# Patient Record
Sex: FEMALE | Race: WHITE | NOT HISPANIC OR LATINO | Employment: FULL TIME | URBAN - METROPOLITAN AREA
[De-identification: names, ages, dates, MRNs, and addresses within clinical notes are randomized per-mention and may not be internally consistent; named-entity substitution may affect disease eponyms.]

---

## 2023-04-10 ENCOUNTER — TELEMEDICINE (OUTPATIENT)
Dept: PRIMARY CARE | Facility: CLINIC | Age: 33
End: 2023-04-10
Payer: COMMERCIAL

## 2023-04-10 DIAGNOSIS — J06.9 VIRAL UPPER RESPIRATORY TRACT INFECTION: Primary | ICD-10-CM

## 2023-04-10 DIAGNOSIS — K50.90 CROHN'S DISEASE WITHOUT COMPLICATION, UNSPECIFIED GASTROINTESTINAL TRACT LOCATION (MULTI): ICD-10-CM

## 2023-04-10 PROCEDURE — 99213 OFFICE O/P EST LOW 20 MIN: CPT | Performed by: FAMILY MEDICINE

## 2023-04-26 NOTE — PROGRESS NOTES
Subjective   Ana Maria Layton is a 32 y.o. female who presents for evaluation of symptoms of a URI. Symptoms include congestion, low grade fever, and sinus pressure. Onset of symptoms was 6 days ago and has been gradually worsening since that time. Treatment to date: decongestants.    Objective   Physical Exam : video visit   32 year old female  No cute distress  Maxiallry sinus tenderness    Assessment/Plan   viral upper respiratory illness.    Discussed diagnosis and treatment of URI.  Discussed the importance of avoiding unnecessary antibiotic therapy.  Suggested symptomatic OTC remedies.  Nasal saline spray for congestion.  Follow up as needed.  Call in 7 days if symptoms aren't resolving.   no

## 2023-05-08 DIAGNOSIS — F41.9 ANXIETY: Primary | ICD-10-CM

## 2023-05-08 RX ORDER — ALPRAZOLAM 1 MG/1
1 TABLET ORAL
COMMUNITY
Start: 2014-05-05 | End: 2023-05-08 | Stop reason: SDUPTHER

## 2023-05-08 RX ORDER — ALPRAZOLAM 1 MG/1
1 TABLET ORAL NIGHTLY PRN
Qty: 30 TABLET | Refills: 0 | Status: SHIPPED | OUTPATIENT
Start: 2023-05-08 | End: 2023-05-10 | Stop reason: SDUPTHER

## 2023-05-10 DIAGNOSIS — F41.9 ANXIETY: ICD-10-CM

## 2023-05-11 RX ORDER — ALPRAZOLAM 1 MG/1
1 TABLET ORAL NIGHTLY PRN
Qty: 45 TABLET | Refills: 0 | Status: SHIPPED | OUTPATIENT
Start: 2023-05-11 | End: 2023-05-15

## 2023-05-15 ENCOUNTER — OFFICE VISIT (OUTPATIENT)
Dept: PRIMARY CARE | Facility: CLINIC | Age: 33
End: 2023-05-15
Payer: COMMERCIAL

## 2023-05-15 VITALS
SYSTOLIC BLOOD PRESSURE: 133 MMHG | OXYGEN SATURATION: 99 % | TEMPERATURE: 97.3 F | BODY MASS INDEX: 50.33 KG/M2 | HEART RATE: 82 BPM | DIASTOLIC BLOOD PRESSURE: 94 MMHG | WEIGHT: 293 LBS | RESPIRATION RATE: 18 BRPM

## 2023-05-15 DIAGNOSIS — F41.9 ANXIETY: Primary | ICD-10-CM

## 2023-05-15 DIAGNOSIS — R03.0 ELEVATED BLOOD PRESSURE READING: ICD-10-CM

## 2023-05-15 DIAGNOSIS — E66.01 MORBID OBESITY WITH BODY MASS INDEX (BMI) GREATER THAN OR EQUAL TO 50 (MULTI): ICD-10-CM

## 2023-05-15 DIAGNOSIS — G89.4 CHRONIC PAIN SYNDROME: ICD-10-CM

## 2023-05-15 DIAGNOSIS — F17.200 SMOKER: ICD-10-CM

## 2023-05-15 DIAGNOSIS — E55.9 VITAMIN D DEFICIENCY: ICD-10-CM

## 2023-05-15 PROBLEM — J01.40 ACUTE NON-RECURRENT PANSINUSITIS: Status: ACTIVE | Noted: 2023-05-15

## 2023-05-15 PROBLEM — R31.21 ASYMPTOMATIC MICROSCOPIC HEMATURIA: Status: ACTIVE | Noted: 2023-05-15

## 2023-05-15 PROBLEM — G89.29 CHRONIC PAIN: Status: ACTIVE | Noted: 2019-11-14

## 2023-05-15 PROBLEM — H66.90 OTITIS MEDIA: Status: ACTIVE | Noted: 2019-11-18

## 2023-05-15 PROBLEM — B00.9 HERPES SIMPLEX TYPE 1 INFECTION: Status: ACTIVE | Noted: 2023-05-15

## 2023-05-15 PROBLEM — G57.90 MONONEUROPATHY OF LOWER EXTREMITY: Status: ACTIVE | Noted: 2023-05-15

## 2023-05-15 PROBLEM — R20.0 LEG NUMBNESS: Status: ACTIVE | Noted: 2023-05-15

## 2023-05-15 PROBLEM — K05.10 GINGIVITIS: Status: ACTIVE | Noted: 2023-05-15

## 2023-05-15 PROBLEM — J02.9 ACUTE PHARYNGITIS: Status: ACTIVE | Noted: 2019-11-18

## 2023-05-15 PROBLEM — F41.0 PANIC DISORDER WITHOUT AGORAPHOBIA: Status: ACTIVE | Noted: 2019-11-14

## 2023-05-15 PROBLEM — K50.90 CROHN'S DISEASE (MULTI): Status: ACTIVE | Noted: 2019-11-14

## 2023-05-15 PROBLEM — R10.11 RIGHT UPPER QUADRANT PAIN: Status: ACTIVE | Noted: 2019-11-18

## 2023-05-15 PROBLEM — R20.8 DECREASED SENSATION OF LEG: Status: ACTIVE | Noted: 2023-05-15

## 2023-05-15 PROBLEM — J06.9 ACUTE UPPER RESPIRATORY INFECTION: Status: ACTIVE | Noted: 2019-11-18

## 2023-05-15 PROBLEM — N39.0 ACUTE LOWER UTI (URINARY TRACT INFECTION): Status: ACTIVE | Noted: 2023-05-15

## 2023-05-15 PROBLEM — J02.9 SORE THROAT: Status: ACTIVE | Noted: 2023-05-15

## 2023-05-15 PROBLEM — R53.81 MALAISE AND FATIGUE: Status: ACTIVE | Noted: 2019-11-14

## 2023-05-15 PROBLEM — R31.9 HEMATURIA OF UNKNOWN ETIOLOGY: Status: ACTIVE | Noted: 2023-05-15

## 2023-05-15 PROBLEM — R05.8 PRODUCTIVE COUGH: Status: ACTIVE | Noted: 2023-05-15

## 2023-05-15 PROBLEM — G47.00 INSOMNIA: Status: ACTIVE | Noted: 2019-11-18

## 2023-05-15 PROBLEM — R39.15 URGENCY OF URINATION: Status: ACTIVE | Noted: 2023-05-15

## 2023-05-15 PROBLEM — R53.83 MALAISE AND FATIGUE: Status: ACTIVE | Noted: 2019-11-14

## 2023-05-15 PROBLEM — J01.90 ACUTE SINUSITIS: Status: ACTIVE | Noted: 2019-11-18

## 2023-05-15 PROBLEM — J06.9 URI WITH COUGH AND CONGESTION: Status: ACTIVE | Noted: 2023-05-15

## 2023-05-15 PROBLEM — R35.89 POLYURIA: Status: ACTIVE | Noted: 2019-11-18

## 2023-05-15 PROBLEM — F32.0 MILD MAJOR DEPRESSION (CMS-HCC): Status: ACTIVE | Noted: 2023-05-15

## 2023-05-15 PROBLEM — K64.9 HEMORRHOIDS: Status: ACTIVE | Noted: 2019-11-14

## 2023-05-15 PROBLEM — F41.1 ANXIETY STATE: Status: ACTIVE | Noted: 2019-11-14

## 2023-05-15 PROBLEM — M54.50 ACUTE LOW BACK PAIN: Status: ACTIVE | Noted: 2023-05-15

## 2023-05-15 PROBLEM — R43.2 DYSGEUSIA: Status: ACTIVE | Noted: 2023-05-15

## 2023-05-15 PROBLEM — I26.99 PULMONARY EMBOLISM (MULTI): Status: ACTIVE | Noted: 2023-05-15

## 2023-05-15 PROBLEM — R05.9 COUGH: Status: ACTIVE | Noted: 2023-05-15

## 2023-05-15 PROBLEM — K50.00 TERMINAL ILEITIS (MULTI): Status: ACTIVE | Noted: 2023-05-15

## 2023-05-15 PROBLEM — R32 URINARY INCONTINENCE: Status: ACTIVE | Noted: 2023-05-15

## 2023-05-15 PROBLEM — F32.A MODERATE DEPRESSIVE DISORDER: Status: ACTIVE | Noted: 2019-11-14

## 2023-05-15 PROBLEM — K21.9 GASTROESOPHAGEAL REFLUX DISEASE: Status: ACTIVE | Noted: 2019-11-18

## 2023-05-15 PROBLEM — K12.0 APHTHOUS ULCER OF MOUTH: Status: ACTIVE | Noted: 2019-11-14

## 2023-05-15 PROBLEM — S93.409A SPRAIN OF ANKLE: Status: ACTIVE | Noted: 2019-11-14

## 2023-05-15 PROBLEM — Z20.822 EXPOSURE TO COVID-19 VIRUS: Status: ACTIVE | Noted: 2023-05-15

## 2023-05-15 PROBLEM — R06.00 DYSPNEA: Status: ACTIVE | Noted: 2019-11-18

## 2023-05-15 PROBLEM — F41.0 PANIC ATTACK: Status: ACTIVE | Noted: 2019-11-14

## 2023-05-15 PROCEDURE — 99214 OFFICE O/P EST MOD 30 MIN: CPT | Performed by: INTERNAL MEDICINE

## 2023-05-15 PROCEDURE — 99406 BEHAV CHNG SMOKING 3-10 MIN: CPT | Performed by: INTERNAL MEDICINE

## 2023-05-15 PROCEDURE — 4004F PT TOBACCO SCREEN RCVD TLK: CPT | Performed by: INTERNAL MEDICINE

## 2023-05-15 RX ORDER — AZITHROMYCIN 250 MG/1
TABLET, FILM COATED ORAL
COMMUNITY
Start: 2023-04-13 | End: 2023-05-15

## 2023-05-15 RX ORDER — BENZONATATE 200 MG/1
1 CAPSULE ORAL 3 TIMES DAILY PRN
COMMUNITY
Start: 2023-04-13 | End: 2023-05-15

## 2023-05-15 RX ORDER — PSEUDOEPHEDRINE HYDROCHLORIDE 60 MG/1
TABLET ORAL
COMMUNITY
Start: 2019-11-14 | End: 2023-05-15

## 2023-05-15 RX ORDER — FLUTICASONE PROPIONATE 50 MCG
SPRAY, SUSPENSION (ML) NASAL
COMMUNITY
Start: 2019-11-14 | End: 2023-05-15

## 2023-05-15 RX ORDER — ESCITALOPRAM OXALATE 20 MG/1
1 TABLET ORAL DAILY
COMMUNITY
Start: 2019-09-24 | End: 2024-04-03 | Stop reason: WASHOUT

## 2023-05-15 RX ORDER — VIT C/E/ZN/COPPR/LUTEIN/ZEAXAN 250MG-90MG
25 CAPSULE ORAL DAILY
Qty: 90 CAPSULE | Refills: 3 | Status: SHIPPED | OUTPATIENT
Start: 2023-05-15

## 2023-05-15 RX ORDER — VIT C/E/ZN/COPPR/LUTEIN/ZEAXAN 250MG-90MG
1 CAPSULE ORAL DAILY
COMMUNITY
Start: 2022-04-28 | End: 2023-05-15 | Stop reason: SDUPTHER

## 2023-05-15 ASSESSMENT — PATIENT HEALTH QUESTIONNAIRE - PHQ9
2. FEELING DOWN, DEPRESSED OR HOPELESS: NOT AT ALL
SUM OF ALL RESPONSES TO PHQ9 QUESTIONS 1 AND 2: 0
1. LITTLE INTEREST OR PLEASURE IN DOING THINGS: NOT AT ALL

## 2023-05-15 NOTE — PATIENT INSTRUCTIONS
Plan:   Discussed about obesity and complications related to it. Discussed about weight reduction and regular exercise to decrease weight. Questions related to it answered to patient's satisfaction.   Discussed in detail about smoking issue. Patient understands health risk including effect on heart, lung, stroke with smoking. Discussed about smoking cessation. Will let me know if interested in quitting smoking.   Low salt diet discussed  Continue Lexapro  F/U in August for Physical

## 2023-05-15 NOTE — PROGRESS NOTES
Pt is here for f/U on anxiety , depressed state, morbid obesity , crohn's dis    No recent flare up on crohn's   She weaned herself off of xanax for over a week     Depression , stress symptoms ok     Taking lexapro, which is helping her     Single mom, has 2 daughter , has full custody now so relieved with that stress now    OBJECTIVE :  Morbidly obese  BP (!) 133/94   Pulse 82   Temp 36.3 °C (97.3 °F)   Resp 18   Wt (!) 150 kg (331 lb)   SpO2 99%   BMI 50.33 kg/m²   Bp is elevated  CVS: S1 S2 + , no S3. No loud heart murmur appreciated. Lungs clear, No edema  Doesnot look anxious       Assessment:    1. Anxiety -now off of xanax   2. Vitamin D deficiency -refill done     3. Morbid obesity with body mass index (BMI) greater than or equal to 50 (C  MS/HCC) wt reduction   4. Chronic pain syndrome -on antidepressant   5. Elevated blood pressure reading -will montiror on diet   6. Smoker -discussed quitting         Plan:   Discussed about obesity and complications related to it. Discussed about weight reduction and regular exercise to decrease weight. Questions related to it answered to patient's satisfaction.   Discussed in detail about smoking issue. Patient understands health risk including effect on heart, lung, stroke with smoking. Discussed about smoking cessation. Will let me know if interested in quitting smoking.   Low salt diet discussed  Continue Lexapro  F/U in August for Physical

## 2023-08-21 ENCOUNTER — APPOINTMENT (OUTPATIENT)
Dept: PRIMARY CARE | Facility: CLINIC | Age: 33
End: 2023-08-21
Payer: COMMERCIAL

## 2023-08-28 ENCOUNTER — APPOINTMENT (OUTPATIENT)
Dept: PRIMARY CARE | Facility: CLINIC | Age: 33
End: 2023-08-28
Payer: COMMERCIAL

## 2023-09-29 ENCOUNTER — HOSPITAL ENCOUNTER (OUTPATIENT)
Dept: DATA CONVERSION | Facility: HOSPITAL | Age: 33
End: 2023-09-29
Attending: SURGERY | Admitting: SURGERY
Payer: COMMERCIAL

## 2023-09-29 DIAGNOSIS — K80.10 CALCULUS OF GALLBLADDER WITH CHRONIC CHOLECYSTITIS WITHOUT OBSTRUCTION: ICD-10-CM

## 2023-09-30 NOTE — H&P
"    History & Physical Reviewed:   Pregnant/Lactating:  ·  Are You Pregnant no (1)   ·  Are You Currently Breastfeeding no (1)     I have reviewed the History and Physical dated:  21-Sep-2023   History and Physical reviewed and relevant findings noted. Patient examined to review pertinent physical  findings.: No significant changes   Home Medications Reviewed: no changes noted   Allergies Reviewed: no changes noted       ERAS (Enhanced Recovery After Surgery):  ·  ERAS Patient: no     Consent:   COVID-19 Consent:  ·  COVID-19 Risk Consent Surgeon has reviewed key risks related to the risk of heath COVID-19 and if they contract COVID-19 what the risks are.       Electronic Signatures:  Kleber Ferris)  (Signed 29-Sep-2023 09:43)   Authored: History & Physical Reviewed, ERAS, Consent,  Note Completion      Last Updated: 29-Sep-2023 09:43 by Kleber Ferris)    References:  1.  Data Referenced From \"Patient Profile - Preop v3\" 28-Sep-2023 11:26   "

## 2023-10-01 NOTE — OP NOTE
PROCEDURE DETAILS    Preoperative Diagnosis:  Calculus of gallbladder with cholecystitis without biliary obstruction, K80.10    Postoperative Diagnosis:  Calculus of gallbladder with cholecystitis without biliary obstruction, K80.10    Surgeon: Kleber Ferris  Resident/Fellow/Other Assistant: Wilmar Watters    Procedure:  1. LAPAROSCOPIC CHOLECYSTECTOMY WITH OPERATIVE CHOLANGIORAMS & C-ARM    Anesthesia: No anesthesiologist associated with this case  Estimated Blood Loss: 0  Findings: Same with cholangiogram demonstrated in diameter ducts without intraluminal filling defect  Specimens(s) Collected: yes,  Gallbladder and stones   Drains and/or Catheters: None  Patient Returned To/Condition: Improved                                Attestation:   Note Completion:  Attending Attestation I performed the procedure without a resident         Electronic Signatures:  Kleber Ferris)  (Signed 29-Sep-2023 09:48)   Authored: Post-Operative Note, Chart Review, Note Completion      Last Updated: 29-Sep-2023 09:48 by Kleber Ferris)

## 2023-10-04 LAB
COMPLETE PATHOLOGY REPORT: NORMAL
CONVERTED CLINICAL DIAGNOSIS-HISTORY: NORMAL
CONVERTED FINAL DIAGNOSIS: NORMAL
CONVERTED FINAL REPORT PDF LINK TO COPY AND PASTE: NORMAL
CONVERTED GROSS DESCRIPTION: NORMAL

## 2023-10-11 PROBLEM — J02.9 PHARYNGITIS: Status: ACTIVE | Noted: 2023-10-11

## 2023-10-11 PROBLEM — K80.10 CALCULUS OF GALLBLADDER WITH CHRONIC CHOLECYSTITIS WITHOUT OBSTRUCTION: Status: ACTIVE | Noted: 2023-10-11

## 2023-10-11 RX ORDER — OXYCODONE AND ACETAMINOPHEN 5; 325 MG/1; MG/1
1 TABLET ORAL EVERY 8 HOURS PRN
COMMUNITY
Start: 2023-09-20

## 2023-10-11 RX ORDER — PSEUDOEPHEDRINE HYDROCHLORIDE 60 MG/1
1 TABLET ORAL
COMMUNITY
Start: 2019-11-14

## 2023-10-11 RX ORDER — ONDANSETRON 4 MG/1
1 TABLET, ORALLY DISINTEGRATING ORAL EVERY 8 HOURS PRN
COMMUNITY
Start: 2023-09-20

## 2023-10-12 ENCOUNTER — OFFICE VISIT (OUTPATIENT)
Dept: SURGERY | Facility: CLINIC | Age: 33
End: 2023-10-12
Payer: COMMERCIAL

## 2023-10-12 VITALS
HEART RATE: 92 BPM | OXYGEN SATURATION: 97 % | BODY MASS INDEX: 44.41 KG/M2 | RESPIRATION RATE: 16 BRPM | HEIGHT: 68 IN | SYSTOLIC BLOOD PRESSURE: 125 MMHG | DIASTOLIC BLOOD PRESSURE: 57 MMHG | TEMPERATURE: 98 F | WEIGHT: 293 LBS

## 2023-10-12 DIAGNOSIS — K81.9 CHOLECYSTITIS: Primary | ICD-10-CM

## 2023-10-12 PROCEDURE — 4004F PT TOBACCO SCREEN RCVD TLK: CPT | Performed by: SURGERY

## 2023-10-12 PROCEDURE — 99024 POSTOP FOLLOW-UP VISIT: CPT | Performed by: SURGERY

## 2023-10-12 NOTE — LETTER
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    If you have any questions or concerns, please don't hesitate to call.         Sincerely,        Kleber Ferris MD

## 2023-10-12 NOTE — PROGRESS NOTES
History Of Present Illness  HPI 33-year-old female with history of Crohn's disease, BMI 51.7 underwent laparoscopic cholecystectomy with unremarkable cholangiograms for acute cholecystitis 3 weeks ago.  She was discharged home on the first postoperative day.  Since discharge she complains of pain only around the umbilicus.  She has had mild exacerbation of her Crohn's symptoms.  However there has been no fever no chills no dark urine or acholic stools  Past Medical History  She has a past medical history of Personal history of other diseases of the digestive system (03/29/2018), Personal history of other endocrine, nutritional and metabolic disease (01/17/2019), Personal history of other mental and behavioral disorders (03/29/2018), and Personal history of pulmonary embolism (08/24/2018).    Surgical History  She has a past surgical history that includes Tonsillectomy (01/18/2018); Colonoscopy (01/18/2018); Other surgical history (01/18/2018); and Other surgical history (01/18/2018).     Social History  She reports that she has been smoking. She has never used smokeless tobacco. No history on file for alcohol use and drug use.    Family History  Family History   Problem Relation Name Age of Onset    Hypertension Mother      Hypertension Father          Allergies  Amoxicillin, Latex, Loracarbef, and Penicillins    Review of Systems currently denies chest pain shortness of breath leg pain.  See HPI     Visit Vitals  /57 (BP Location: Right arm, Patient Position: Sitting, BP Cuff Size: Large adult)   Pulse 92   Temp 36.7 °C (98 °F) (Temporal)   Resp 16        Physical Exam    BMI 51.7  General  Mental status: Alert. General Appearance: Not in acute distress. Orientation: Oriented X4.  Respiratory effort symmetric without accessory muscle use  Abdomen  Inspection: Inspection of the abdomen reveals - non-distended. Surgical Incision Details: [ Clean, dry and intact ] Palpation/Percussion: Palpation and Percussion  "of the abdomen reveal - Soft and non tender.    Peripheral Vasclar  Lower Extremity:  Palpation: Edema - Left: no edema. Right: no edema.  Last Recorded Vitals  Blood pressure 125/57, pulse 92, temperature 36.7 °C (98 °F), temperature source Temporal, resp. rate 16, height 1.727 m (5' 8\"), weight (!) 154 kg (340 lb 9.6 oz), SpO2 97 %.    Relevant Results      FL less than 1 hour    Result Date: 9/29/2023  Interpreted By:  GAYLA OCHOA MD MRN: [id number] Patient Name: JOSE SWANN  STUDY: FLUOROSCOPY, UP TO 1 HR;  9/29/2023 9:20 am  INDICATION: LAP YURIY .  COMPARISON: None.  ACCESSION NUMBER(S): [id number]  ORDERING CLINICIAN: SANJUANA NGUYEN  TECHNIQUE: C-arm images with contrast injection of the biliary tree via a stent at the cystic duct were submitted for interpretation. Total fluoroscopic time was  41 seconds. Recommend correlation to real time fluoroscopy and to final operative report.  FINDINGS: Common bile and hepatic ducts:  Unremarkable without dilatation or filling defects. Intrahepatic biliary tree:  Grossly unremarkable with partial filling as visualized. Pancreatic duct:  Not visualized. Duodenal:  Contrast excretion indicates biliary patency. Other findings:  None significant.      1.  Unremarkable intraoperative cholangiogram. 2.  Findings as detailed above.  Correlate with real time fluoroscopy and operative report.      US gallbladder    Result Date: 9/20/2023  Interpreted By:  SANTIAGO BADILLO MD MRN: [id number] Patient Name: JOSE SWANN  STUDY: US GALLBLADDER;  9/20/2023 8:54 am  INDICATION: ruq .  COMPARISON: None.  ACCESSION NUMBER(S): [id number]  ORDERING CLINICIAN: ANA LUISA BARAJAS  TECHNIQUE: Multiple images of the right upper quadrant were obtained.  FINDINGS: LIVER: Coarsened heterogeneous liver suggesting fatty infiltration. The liver is borderline enlarged measuring 20 cm. No definite focal liver lesions.  GALLBLADDER: The gallbladder is remarkable for a large calcified gallstone. No gallbladder " wall thickening or ultrasonic Hodges sign.   BILIARY TREE: The common bile duct is 4 mm.  PANCREAS: The pancreas is mostly obscured by bowel gas and not evaluated.  RIGHT KIDNEY: The right kidney is 12.9 cm in longest axis and without hydronephrosis.      Coarsened echogenic liver suggesting fatty infiltration.  Cholelithiasis.  Suboptimal visualization of the pancreas.  MACRO: None        @[email or web address]@    Assessment/Plan       Satisfactory postop course.  I reviewed the documentation, imaging and laboratory studies.  No restrictions to diet or activity       I spent  minutes in the professional and overall care of this patient.      Kleber Ferris MD

## 2023-10-12 NOTE — PATIENT INSTRUCTIONS
Thank you for choosing [facility].  Your surgical incisions following the recent removal of your gallbladder are without signs of infection.  There are no restrictions to diet or activity.  You may continue to experience some pulling pain around her navel.  This is not unexpected.  Please use Tylenol, ice for discomfort.  I recommend continued follow-up with your gastroenterologist regarding your symptoms of Crohn's disease.  Contact the office for any questions regarding today's exam or your recent procedure

## 2023-11-17 LAB
ATRIAL RATE: 94 BPM
P AXIS: 25 DEGREES
P OFFSET: 193 MS
P ONSET: 145 MS
PR INTERVAL: 150 MS
Q ONSET: 220 MS
QRS COUNT: 16 BEATS
QRS DURATION: 80 MS
QT INTERVAL: 358 MS
QTC CALCULATION(BAZETT): 447 MS
QTC FREDERICIA: 415 MS
R AXIS: 29 DEGREES
T AXIS: -3 DEGREES
T OFFSET: 399 MS
VENTRICULAR RATE: 94 BPM

## 2024-04-01 ENCOUNTER — APPOINTMENT (OUTPATIENT)
Dept: PRIMARY CARE | Facility: CLINIC | Age: 34
End: 2024-04-01
Payer: COMMERCIAL

## 2024-04-03 ENCOUNTER — OFFICE VISIT (OUTPATIENT)
Dept: PRIMARY CARE | Facility: CLINIC | Age: 34
End: 2024-04-03
Payer: COMMERCIAL

## 2024-04-03 VITALS
BODY MASS INDEX: 44.41 KG/M2 | WEIGHT: 293 LBS | DIASTOLIC BLOOD PRESSURE: 120 MMHG | TEMPERATURE: 98 F | SYSTOLIC BLOOD PRESSURE: 170 MMHG | OXYGEN SATURATION: 98 % | HEART RATE: 110 BPM | HEIGHT: 68 IN

## 2024-04-03 DIAGNOSIS — F17.200 SMOKER: ICD-10-CM

## 2024-04-03 DIAGNOSIS — K50.90 CROHN'S DISEASE WITHOUT COMPLICATION, UNSPECIFIED GASTROINTESTINAL TRACT LOCATION (MULTI): ICD-10-CM

## 2024-04-03 DIAGNOSIS — N39.41 URGE INCONTINENCE: ICD-10-CM

## 2024-04-03 DIAGNOSIS — I10 BENIGN ESSENTIAL HTN: ICD-10-CM

## 2024-04-03 DIAGNOSIS — E66.01 MORBID OBESITY WITH BODY MASS INDEX (BMI) GREATER THAN OR EQUAL TO 50 (MULTI): ICD-10-CM

## 2024-04-03 DIAGNOSIS — Z00.00 ANNUAL PHYSICAL EXAM: Primary | ICD-10-CM

## 2024-04-03 DIAGNOSIS — Z90.49 S/P LAPAROSCOPIC CHOLECYSTECTOMY: ICD-10-CM

## 2024-04-03 DIAGNOSIS — F41.9 ANXIETY: ICD-10-CM

## 2024-04-03 PROCEDURE — 3080F DIAST BP >= 90 MM HG: CPT | Performed by: INTERNAL MEDICINE

## 2024-04-03 PROCEDURE — 3077F SYST BP >= 140 MM HG: CPT | Performed by: INTERNAL MEDICINE

## 2024-04-03 PROCEDURE — 99395 PREV VISIT EST AGE 18-39: CPT | Performed by: INTERNAL MEDICINE

## 2024-04-03 PROCEDURE — 99406 BEHAV CHNG SMOKING 3-10 MIN: CPT | Performed by: INTERNAL MEDICINE

## 2024-04-03 RX ORDER — BUSPIRONE HYDROCHLORIDE 10 MG/1
10 TABLET ORAL 3 TIMES DAILY
Qty: 90 TABLET | Refills: 11 | Status: SHIPPED | OUTPATIENT
Start: 2024-04-03 | End: 2025-04-03

## 2024-04-03 RX ORDER — OXYBUTYNIN CHLORIDE 5 MG/1
5 TABLET ORAL DAILY
Qty: 30 TABLET | Refills: 11 | Status: SHIPPED | OUTPATIENT
Start: 2024-04-03 | End: 2025-04-03

## 2024-04-03 RX ORDER — LISINOPRIL 20 MG/1
20 TABLET ORAL DAILY
Qty: 100 TABLET | Refills: 3 | Status: SHIPPED | OUTPATIENT
Start: 2024-04-03 | End: 2024-05-07 | Stop reason: SDUPTHER

## 2024-04-03 NOTE — PROGRESS NOTES
"My nurse note reviewed. Patient is here for:  Hypertension (1 time a week chest tightness/ ) and Annual Exam       Pt is here for annual exam and follow up     Had laparoscopic cholecystectomy for GB stones. Last yr. Doing ok     Feel flushed in afternoon lately , thinks it is due to high BP   Gets anxious very easily , works in accounting.   Doesnot feel depressed , doesnot want to be on antidepressant. On lexapro she gained lot of weight.     Patient denies any shortness of breath, PND, orthopnea, chest pain , palpitation, syncope or edema in legs  patient denies any abdominal pain, tenderness, nausea, vomiting, change in bowel habits or blood in stool.  Patient denies any weakness in extremities.. Denies any headache, visual symptoms , speech problems or  tremors . No TIA or stroke like symptoms..    Lives with 2 kids and dog.   Just started vaping lately . She thinks it is due to her anxiety issue.     Hx of crohn's  dis. Stable lately .   EKG was normal last year   REVIEW OF SYSTEMS:   All other systems have been reviewed and are negative in relation to patient's complaint and other than what is mentioned in History of present illness.       OBJECTIVE :  Morbidly obese  BP (!) 170/120   Pulse 110   Temp 36.7 °C (98 °F)   Ht 1.727 m (5' 8\")   Wt (!) 156 kg (343 lb)   SpO2 98%   BMI 52.15 kg/m²   Repeat bp is high too  Vitals noted   Not in acute distress  Conj Pink, No icterus  Neck:No Cervical LN enlargement, No Thyroid enlargement No carotid bruit  Lungs: good air entry bilaterally, no rales or rhonchi  CVS: S1 S2 + , no S3. No loud heart murmur heard.   Abdomen: Soft, non tender , BS +. no organomegaly , no CVA tenderness  MSK: No spine tenderness or muscle tenderness noted on gross examination  CNS: Pt is alert, moving all 4 extremities. no motor weakness or abnormal movements noted on gross examination.  Extremities: No edema, No calf tenderness, Neda's sign negative.    Assessment:  1. Annual " physical exam -done. Tests ordered CBC and Auto Differential    Basic Metabolic Panel    Hepatic Function Panel    Lipid Panel Non-Fasting    Thyroid Stimulating Hormone      2. Anxiety -new med started busPIRone (Buspar) 10 mg tablet      3. Crohn's disease without complication, unspecified gastrointestinal tract location (CMS/HCC)  Hx of. Doing ok      4. Smoker  Discussed in detail about smoking /vaping issue. Patient understands health risk including effect on heart, lung, stroke with smoking. Discussed about smoking cessation. Will let me know if interested in quitting smoking. Total time was > 3 minutes.        5. Morbid obesity with body mass index (BMI) greater than or equal to 50 (CMS/HCC)  Discussed about obesity and complications related to it. Discussed about weight reduction and regular exercise to decrease weight. Questions related to it answered to patient's satisfaction.                7. S/P laparoscopic cholecystectomy        8. Urge incontinence  oxybutynin (Ditropan) 5 mg tablet      9. Benign essential HTN uncontrolled  Started on new med.  lisinopril 20 mg tablet        Plan  Annual physical done.   Started on new med for BP and anxiety   Side effects of medication were discussed. All questions related to medication answered to patient's satisfaction  Refill on oxybutnin done. She used to get it from her urologist . Has not seen him for a while.   Discussed about obesity and complications related to it. Discussed about weight reduction and regular exercise to decrease weight. Questions related to it answered to patient's satisfaction.  Blood tests ordered to be done   Continue other meds as before.   F/U 6 weeks for HTN follow up

## 2024-04-03 NOTE — PATIENT INSTRUCTIONS
Plan  Annual physical done.   Started on new med for BP and anxiety   Refill on oxybutnin done.   Discussed about obesity and complications related to it. Discussed about weight reduction and regular exercise to decrease weight. Questions related to it answered to patient's satisfaction.  Blood tests ordered to be done   Continue other meds as before.   F/U 6 weeks for HTN follow up

## 2024-04-20 ENCOUNTER — LAB (OUTPATIENT)
Dept: LAB | Facility: LAB | Age: 34
End: 2024-04-20
Payer: COMMERCIAL

## 2024-04-20 DIAGNOSIS — Z00.00 ANNUAL PHYSICAL EXAM: ICD-10-CM

## 2024-04-20 LAB
ALBUMIN SERPL BCP-MCNC: 4.2 G/DL (ref 3.4–5)
ALP SERPL-CCNC: 58 U/L (ref 33–110)
ALT SERPL W P-5'-P-CCNC: 27 U/L (ref 7–45)
ANION GAP SERPL CALC-SCNC: 12 MMOL/L (ref 10–20)
AST SERPL W P-5'-P-CCNC: 15 U/L (ref 9–39)
BASOPHILS # BLD AUTO: 0.03 X10*3/UL (ref 0–0.1)
BASOPHILS NFR BLD AUTO: 0.5 %
BILIRUB DIRECT SERPL-MCNC: 0.1 MG/DL (ref 0–0.3)
BILIRUB SERPL-MCNC: 0.4 MG/DL (ref 0–1.2)
BUN SERPL-MCNC: 18 MG/DL (ref 6–23)
CALCIUM SERPL-MCNC: 9 MG/DL (ref 8.6–10.3)
CHLORIDE SERPL-SCNC: 106 MMOL/L (ref 98–107)
CHOLEST SERPL-MCNC: 167 MG/DL (ref 0–199)
CHOLESTEROL/HDL RATIO: 4.2
CO2 SERPL-SCNC: 28 MMOL/L (ref 21–32)
CREAT SERPL-MCNC: 0.9 MG/DL (ref 0.5–1.05)
EGFRCR SERPLBLD CKD-EPI 2021: 86 ML/MIN/1.73M*2
EOSINOPHIL # BLD AUTO: 0.1 X10*3/UL (ref 0–0.7)
EOSINOPHIL NFR BLD AUTO: 1.8 %
ERYTHROCYTE [DISTWIDTH] IN BLOOD BY AUTOMATED COUNT: 14.6 % (ref 11.5–14.5)
GLUCOSE SERPL-MCNC: 103 MG/DL (ref 74–99)
HCT VFR BLD AUTO: 46 % (ref 36–46)
HDLC SERPL-MCNC: 40.2 MG/DL
HGB BLD-MCNC: 13.9 G/DL (ref 12–16)
IMM GRANULOCYTES # BLD AUTO: 0 X10*3/UL (ref 0–0.7)
IMM GRANULOCYTES NFR BLD AUTO: 0 % (ref 0–0.9)
LYMPHOCYTES # BLD AUTO: 1.57 X10*3/UL (ref 1.2–4.8)
LYMPHOCYTES NFR BLD AUTO: 28.1 %
MCH RBC QN AUTO: 24.4 PG (ref 26–34)
MCHC RBC AUTO-ENTMCNC: 30.2 G/DL (ref 32–36)
MCV RBC AUTO: 81 FL (ref 80–100)
MONOCYTES # BLD AUTO: 0.31 X10*3/UL (ref 0.1–1)
MONOCYTES NFR BLD AUTO: 5.5 %
NEUTROPHILS # BLD AUTO: 3.58 X10*3/UL (ref 1.2–7.7)
NEUTROPHILS NFR BLD AUTO: 64.1 %
NON-HDL CHOLESTEROL: 127 MG/DL (ref 0–149)
NRBC BLD-RTO: 0 /100 WBCS (ref 0–0)
PLATELET # BLD AUTO: 283 X10*3/UL (ref 150–450)
POTASSIUM SERPL-SCNC: 4.9 MMOL/L (ref 3.5–5.3)
PROT SERPL-MCNC: 6.7 G/DL (ref 6.4–8.2)
RBC # BLD AUTO: 5.7 X10*6/UL (ref 4–5.2)
SODIUM SERPL-SCNC: 141 MMOL/L (ref 136–145)
TSH SERPL-ACNC: 2.43 MIU/L (ref 0.44–3.98)
WBC # BLD AUTO: 5.6 X10*3/UL (ref 4.4–11.3)

## 2024-04-20 PROCEDURE — 80053 COMPREHEN METABOLIC PANEL: CPT

## 2024-04-20 PROCEDURE — 83718 ASSAY OF LIPOPROTEIN: CPT

## 2024-04-20 PROCEDURE — 36415 COLL VENOUS BLD VENIPUNCTURE: CPT

## 2024-04-20 PROCEDURE — 82465 ASSAY BLD/SERUM CHOLESTEROL: CPT

## 2024-04-20 PROCEDURE — 82248 BILIRUBIN DIRECT: CPT

## 2024-04-20 PROCEDURE — 84443 ASSAY THYROID STIM HORMONE: CPT

## 2024-04-20 PROCEDURE — 85025 COMPLETE CBC W/AUTO DIFF WBC: CPT

## 2024-05-07 ENCOUNTER — OFFICE VISIT (OUTPATIENT)
Dept: PRIMARY CARE | Facility: CLINIC | Age: 34
End: 2024-05-07
Payer: COMMERCIAL

## 2024-05-07 VITALS
HEART RATE: 101 BPM | DIASTOLIC BLOOD PRESSURE: 102 MMHG | BODY MASS INDEX: 50.94 KG/M2 | TEMPERATURE: 97.2 F | WEIGHT: 293 LBS | SYSTOLIC BLOOD PRESSURE: 138 MMHG | OXYGEN SATURATION: 98 %

## 2024-05-07 DIAGNOSIS — E66.01 MORBID OBESITY WITH BODY MASS INDEX (BMI) GREATER THAN OR EQUAL TO 50 (MULTI): ICD-10-CM

## 2024-05-07 DIAGNOSIS — I10 BENIGN ESSENTIAL HTN: Primary | ICD-10-CM

## 2024-05-07 DIAGNOSIS — J06.9 VIRAL UPPER RESPIRATORY TRACT INFECTION: ICD-10-CM

## 2024-05-07 PROCEDURE — 3075F SYST BP GE 130 - 139MM HG: CPT | Performed by: INTERNAL MEDICINE

## 2024-05-07 PROCEDURE — 3080F DIAST BP >= 90 MM HG: CPT | Performed by: INTERNAL MEDICINE

## 2024-05-07 PROCEDURE — 99214 OFFICE O/P EST MOD 30 MIN: CPT | Performed by: INTERNAL MEDICINE

## 2024-05-07 RX ORDER — LISINOPRIL 30 MG/1
30 TABLET ORAL DAILY
Qty: 100 TABLET | Refills: 3 | Status: SHIPPED | OUTPATIENT
Start: 2024-05-07 | End: 2025-06-11

## 2024-05-07 RX ORDER — PROMETHAZINE HYDROCHLORIDE AND DEXTROMETHORPHAN HYDROBROMIDE 6.25; 15 MG/5ML; MG/5ML
5 SYRUP ORAL EVERY 4 HOURS PRN
Qty: 200 ML | Refills: 1 | Status: SHIPPED | OUTPATIENT
Start: 2024-05-07 | End: 2024-06-06

## 2024-05-07 NOTE — PATIENT INSTRUCTIONS
Plan  Increase lisinopril to 30 mg a  day from 20 mg a day   New rx for cough , congestion sent to pharmacy   Increase oral fluids   Discussed about obesity and complications related to it. Discussed about weight reduction and regular exercise to decrease weight. Questions related to it answered to patient's satisfaction.  F/U 6 to 8 weeks for HTN follow up

## 2024-05-07 NOTE — PROGRESS NOTES
My nurse note reviewed. Patient is here for:  Follow-up (Feeling sick/Hurts when talking)       Here for HTN , f/U and cold symptoms     Pt is feeling cold symptoms for 4 days which includes cough , congestion , scratchy throat .   No side effects with lisinopril  Patient denies any shortness of breath, PND, orthopnea, chest pain , palpitation, syncope or edema in legs  Hx of crohns and obesity   Quit vaping now   OBJECTIVE :  BP (!) 138/102   Pulse 101   Temp 36.2 °C (97.2 °F)   Wt (!) 152 kg (335 lb)   SpO2 98%   BMI 50.94 kg/m²   Morbidly obese  Vitals noted  not in acute distress  conj pink, no sinus tenderness. Ears: no discharge or redness noted. Both TM looked OK. No cervical LN enlargement . Throat: mild erythema noted , no exudate.   Lungs clear. Heart S1 S2 +, no S3      Assessment:  1. Benign essential HTN - uncontrolled     2. Viral upper respiratory tract infection -new med    3. Morbid obesity with body mass index (BMI) greater than or equal to 50 (Multi) -wt reduction      Plan  Increase lisinopril to 30 mg a  day from 20 mg a day   New rx for cough , congestion sent to pharmacy   Increase oral fluids   Discussed about obesity and complications related to it. Discussed about weight reduction and regular exercise to decrease weight. Questions related to it answered to patient's satisfaction.  F/U 6 to 8 weeks for HTN follow up

## 2024-05-10 ENCOUNTER — TELEPHONE (OUTPATIENT)
Dept: PRIMARY CARE | Facility: CLINIC | Age: 34
End: 2024-05-10
Payer: COMMERCIAL

## 2024-05-23 ENCOUNTER — TELEPHONE (OUTPATIENT)
Dept: PRIMARY CARE | Facility: CLINIC | Age: 34
End: 2024-05-23
Payer: COMMERCIAL

## 2024-06-24 ENCOUNTER — APPOINTMENT (OUTPATIENT)
Dept: PRIMARY CARE | Facility: CLINIC | Age: 34
End: 2024-06-24
Payer: COMMERCIAL

## 2024-06-24 VITALS
WEIGHT: 293 LBS | OXYGEN SATURATION: 98 % | HEART RATE: 96 BPM | TEMPERATURE: 97.2 F | DIASTOLIC BLOOD PRESSURE: 88 MMHG | BODY MASS INDEX: 50.33 KG/M2 | SYSTOLIC BLOOD PRESSURE: 118 MMHG

## 2024-06-24 DIAGNOSIS — I10 BENIGN ESSENTIAL HTN: Primary | ICD-10-CM

## 2024-06-24 PROCEDURE — 3074F SYST BP LT 130 MM HG: CPT | Performed by: INTERNAL MEDICINE

## 2024-06-24 PROCEDURE — 99213 OFFICE O/P EST LOW 20 MIN: CPT | Performed by: INTERNAL MEDICINE

## 2024-06-24 PROCEDURE — 3079F DIAST BP 80-89 MM HG: CPT | Performed by: INTERNAL MEDICINE

## 2024-06-24 ASSESSMENT — PATIENT HEALTH QUESTIONNAIRE - PHQ9
1. LITTLE INTEREST OR PLEASURE IN DOING THINGS: NOT AT ALL
SUM OF ALL RESPONSES TO PHQ9 QUESTIONS 1 AND 2: 0
2. FEELING DOWN, DEPRESSED OR HOPELESS: NOT AT ALL

## 2024-06-24 NOTE — PATIENT INSTRUCTIONS
Plan  Current medications are effective. advised to continue current medications.  Discussed about obesity and complications related to it. Discussed about weight reduction and regular exercise to decrease weight. Questions related to it answered to patient's satisfaction.  F/U 3 months or as needed

## 2024-06-24 NOTE — PROGRESS NOTES
My nurse note reviewed. Patient is here for:  Hypertension and Follow-up   Pt is here for HTN follow up   Taking meds ok   Not doing much exercise   Works in accounting , desk job  Patient denies any shortness of breath, PND, orthopnea, chest pain , palpitation, syncope or edema in legs  OBJECTIVE :  Morbidly obese  /88   Pulse 96   Temp 36.2 °C (97.2 °F)   Wt (!) 150 kg (331 lb)   SpO2 98%   BMI 50.33 kg/m²   CVS: S1 S2 + , no S3. No loud heart murmur appreciated. Lungs clear, No edema      Assessment:  HTN -controlled  Morbid obesity     Plan  Current medications are effective. advised to continue current medications.  Discussed about obesity and complications related to it. Discussed about weight reduction and regular exercise to decrease weight. Questions related to it answered to patient's satisfaction.  F/U 3 months or as needed

## 2024-07-09 ENCOUNTER — TELEPHONE (OUTPATIENT)
Dept: PRIMARY CARE | Facility: CLINIC | Age: 34
End: 2024-07-09
Payer: COMMERCIAL

## 2024-07-09 NOTE — TELEPHONE ENCOUNTER
Patient was calling to see if she would be able to get a referral for cardiology to get her BP controlled. Please advise.

## 2024-07-10 DIAGNOSIS — I10 BENIGN ESSENTIAL HTN: Primary | ICD-10-CM

## 2024-07-10 NOTE — TELEPHONE ENCOUNTER
Patient called and stated that she already has an appt. with Cardiology but will need just a referral to be seen. Please advise

## 2024-07-23 PROBLEM — Z86.711 HISTORY OF PULMONARY EMBOLISM: Status: ACTIVE | Noted: 2023-05-15

## 2024-07-23 PROBLEM — I26.99 PULMONARY EMBOLISM (MULTI): Status: RESOLVED | Noted: 2023-05-15 | Resolved: 2024-07-23

## 2024-07-23 PROBLEM — N39.0 ACUTE LOWER URINARY TRACT INFECTION: Status: ACTIVE | Noted: 2024-07-23

## 2024-07-23 PROBLEM — M54.50 ACUTE LOW BACK PAIN: Status: ACTIVE | Noted: 2024-07-23

## 2024-07-23 PROBLEM — I10 BENIGN ESSENTIAL HYPERTENSION: Status: ACTIVE | Noted: 2024-07-23

## 2024-08-05 ENCOUNTER — APPOINTMENT (OUTPATIENT)
Dept: PRIMARY CARE | Facility: CLINIC | Age: 34
End: 2024-08-05
Payer: COMMERCIAL

## 2024-08-05 VITALS
SYSTOLIC BLOOD PRESSURE: 142 MMHG | TEMPERATURE: 95.7 F | WEIGHT: 293 LBS | DIASTOLIC BLOOD PRESSURE: 88 MMHG | HEART RATE: 111 BPM | BODY MASS INDEX: 50.48 KG/M2 | OXYGEN SATURATION: 98 %

## 2024-08-05 DIAGNOSIS — F41.9 ANXIETY: Primary | ICD-10-CM

## 2024-08-05 DIAGNOSIS — Z02.9 ENCOUNTER FOR NARCOTIC CONTRACT DISCUSSION: ICD-10-CM

## 2024-08-05 DIAGNOSIS — E66.01 MORBID OBESITY WITH BODY MASS INDEX (BMI) GREATER THAN OR EQUAL TO 50 (MULTI): ICD-10-CM

## 2024-08-05 DIAGNOSIS — I10 BENIGN ESSENTIAL HTN: ICD-10-CM

## 2024-08-05 PROCEDURE — 3079F DIAST BP 80-89 MM HG: CPT | Performed by: INTERNAL MEDICINE

## 2024-08-05 PROCEDURE — 3077F SYST BP >= 140 MM HG: CPT | Performed by: INTERNAL MEDICINE

## 2024-08-05 PROCEDURE — 99214 OFFICE O/P EST MOD 30 MIN: CPT | Performed by: INTERNAL MEDICINE

## 2024-08-05 RX ORDER — NALOXONE HYDROCHLORIDE 4 MG/.1ML
1 SPRAY NASAL AS NEEDED
Qty: 2 EACH | Refills: 0 | Status: SHIPPED | OUTPATIENT
Start: 2024-08-05 | End: 2024-08-09 | Stop reason: ALTCHOICE

## 2024-08-05 RX ORDER — ALPRAZOLAM 1 MG/1
1 TABLET ORAL 2 TIMES DAILY PRN
Qty: 45 TABLET | Refills: 1 | Status: SHIPPED | OUTPATIENT
Start: 2024-08-05 | End: 2025-04-02

## 2024-08-05 ASSESSMENT — PATIENT HEALTH QUESTIONNAIRE - PHQ9
SUM OF ALL RESPONSES TO PHQ9 QUESTIONS 1 AND 2: 0
2. FEELING DOWN, DEPRESSED OR HOPELESS: NOT AT ALL
1. LITTLE INTEREST OR PLEASURE IN DOING THINGS: NOT AT ALL

## 2024-08-05 NOTE — PATIENT INSTRUCTIONS
Plan  Stop Buspar   Restarted back on Xanax. Questions answered  Narcotic contract done . Questions answered   Refill on xanax done   Discussed low salt diet  Patient  was advised to call back if symptoms persist after few days or worsen.   Patient agreed with plan and felt satisfied.  Follow up in 3 months or as needed.

## 2024-08-05 NOTE — PROGRESS NOTES
My nurse note reviewed. Patient is here for:  Follow-up (Buspar is causing adverse reaction./Chest feeling heavy heartbeat)       PT IS here for anxiety issue and HTN     She was switched to buspar few months ago but she did not feel well on it and made her more anxious with palpitation etc so for few days she stopped it and symptoms improved but her anxiety symptoms got worse so wants to go back on xanax.  Discussed about side effects, dependency, addiction of long term and frequent narcotics/Benzodiazepine use. Patient understood and agreed inform me if any side effects occur.  Understands risk/benefits  Has appt with Dr Kenney , cardiology , in few days     Patient denies any shortness of breath, PND, orthopnea, chest pain ,  syncope or edema in legs    OBJECTIVE :  /88   Pulse (!) 111   Temp 35.4 °C (95.7 °F)   Wt (!) 151 kg (332 lb)   SpO2 98%   BMI 50.48 kg/m²   Bp is high  CVS: S1 S2 + , no S3. No loud heart murmur appreciated. Lungs clear, No edema  Morbidly obese   CNS: Patient is alert, oriented moving all 4 extremities well. No motor weakness noted on gross neurological exam. No involuntary movements or tremors noted.    Assessment:  1. Anxiety  uncontrolled  Restarted on xanax      2. Morbid obesity with body mass index (BMI) greater than or equal to 50 (Multi)  Discussed about obesity and complications related to it. Discussed about weight reduction and regular exercise to decrease weight. Questions related to it answered to patient's satisfaction.        3. Benign essential HTN  uncontrolled        4. Encounter for narcotic contract discussion  Done.         Plan  Stop Buspar   Restarted back on Xanax. Questions answered  Narcotic contract done . Questions answered   Refill on xanax done   Discussed low salt diet  Patient  was advised to call back if symptoms persist after few days or worsen.   Patient agreed with plan and felt satisfied.  Follow up in 3 months or as needed.

## 2024-08-08 NOTE — PROGRESS NOTES
"Subjective   Ana Maria Layton is a 34 y.o. female.    Chief Complaint:  Hypertension.  Intermittent flushing.  Midsternal chest pressure and heaviness.    HPI    This is a 34-year-old woman who presents with paroxysmal hypertension.  She describes episodes where she becomes very flushed.  She then checks her blood pressures and it is high as 160/100.  This occurs fairly regularly during the day.  At other times her blood pressures are improved.  The chest tightness is described as being midsternal pressure and heaviness in the chest.  This lasts for a few minutes.  No radiation to the neck or jaw.    Her cardiac history is significant for hypertension.  Other coronary disease risk factors include hyperlipidemia.    Past medical history: Had a cholecystectomy several months ago.  History of Crohn's disease.  History of anxiety.  History of obesity.    Allergies to medications: Amoxicillin, penicillins, Lorabid.    Social history: History of smoking.  Quit several months ago.  Works for [employer].  Does [employer].  Has 2 children ages 16 and 6.    Family history: Father's had some heart problems unclear etiology.  No other family history.    Review of Systems   Constitutional: Positive for diaphoresis and malaise/fatigue.   Cardiovascular:  Positive for chest pain and dyspnea on exertion.   Neurological:  Positive for headaches.       Current Outpatient Medications   Medication Sig Dispense Refill    ALPRAZolam (Xanax) 1 mg tablet Take 1 tablet (1 mg) by mouth 2 times a day as needed for anxiety. 45 tablet 1    lisinopril 30 mg tablet Take 1 tablet (30 mg) by mouth once daily. 100 tablet 3    hydroCHLOROthiazide (Microzide) 12.5 mg capsule Take 1 capsule (12.5 mg) by mouth once daily in the morning. 90 capsule 3     No current facility-administered medications for this visit.        Visit Vitals  /90 (BP Location: Right arm)   Pulse 101   Ht 1.727 m (5' 8\")   Wt (!) 151 kg (332 lb)   SpO2 98%   BMI 50.48 kg/m² "   Smoking Status Former   BSA 2.69 m²        Objective     Constitutional:       Appearance: Not in distress.   Neck:      Vascular: JVD normal.   Pulmonary:      Breath sounds: Normal breath sounds.   Cardiovascular:      Normal rate. Regular rhythm. Normal S1. Normal S2.       Murmurs: There is no murmur.      No gallop.    Pulses:     Intact distal pulses.   Edema:     Peripheral edema absent.   Abdominal:      General: There is no distension.      Palpations: Abdomen is soft.   Neurological:      Mental Status: Alert.         Lab Review:   Lab Results   Component Value Date     04/20/2024    K 4.9 04/20/2024     04/20/2024    CO2 28 04/20/2024    BUN 18 04/20/2024    CREATININE 0.90 04/20/2024    GLUCOSE 103 (H) 04/20/2024    CALCIUM 9.0 04/20/2024     Lab Results   Component Value Date    CHOL 167 04/20/2024    HDL 40.2 04/20/2024       Assessment:    1.  Paroxysmal hypertension and flushing.  Symptoms are consistent with carcinoid syndrome.  Will screen for carcinoid.  Will also screen for pheochromocytoma.  In the interim we will add hydrochlorothiazide 12.5 mg daily.  Latest potassium is 4.9.    2.  Chest tightness.  Unclear etiology.  May do additional testing in the future.    3.  Hyperlipidemia.  Total cholesterol is 167 with HDL 40 and non-HDL of 127.

## 2024-08-09 ENCOUNTER — LAB (OUTPATIENT)
Dept: LAB | Facility: LAB | Age: 34
End: 2024-08-09
Payer: COMMERCIAL

## 2024-08-09 ENCOUNTER — APPOINTMENT (OUTPATIENT)
Dept: CARDIOLOGY | Facility: CLINIC | Age: 34
End: 2024-08-09
Payer: COMMERCIAL

## 2024-08-09 VITALS
SYSTOLIC BLOOD PRESSURE: 126 MMHG | HEIGHT: 68 IN | OXYGEN SATURATION: 98 % | BODY MASS INDEX: 44.41 KG/M2 | DIASTOLIC BLOOD PRESSURE: 90 MMHG | HEART RATE: 101 BPM | WEIGHT: 293 LBS

## 2024-08-09 DIAGNOSIS — I10 PAROXYSMAL HYPERTENSION: ICD-10-CM

## 2024-08-09 DIAGNOSIS — E34.0 CARCINOID SYNDROME (MULTI): Primary | ICD-10-CM

## 2024-08-09 DIAGNOSIS — Z86.711 HISTORY OF PULMONARY EMBOLISM: ICD-10-CM

## 2024-08-09 DIAGNOSIS — E66.01 MORBID OBESITY (MULTI): ICD-10-CM

## 2024-08-09 DIAGNOSIS — E34.0 CARCINOID SYNDROME (MULTI): ICD-10-CM

## 2024-08-09 PROBLEM — E34.00 CARCINOID SYNDROME: Status: ACTIVE | Noted: 2024-08-09

## 2024-08-09 PROCEDURE — 83497 ASSAY OF 5-HIAA: CPT

## 2024-08-09 PROCEDURE — 82384 ASSAY THREE CATECHOLAMINES: CPT

## 2024-08-09 PROCEDURE — 3080F DIAST BP >= 90 MM HG: CPT | Performed by: INTERNAL MEDICINE

## 2024-08-09 PROCEDURE — 3008F BODY MASS INDEX DOCD: CPT | Performed by: INTERNAL MEDICINE

## 2024-08-09 PROCEDURE — 99213 OFFICE O/P EST LOW 20 MIN: CPT | Performed by: INTERNAL MEDICINE

## 2024-08-09 PROCEDURE — 36415 COLL VENOUS BLD VENIPUNCTURE: CPT

## 2024-08-09 PROCEDURE — 99203 OFFICE O/P NEW LOW 30 MIN: CPT | Performed by: INTERNAL MEDICINE

## 2024-08-09 PROCEDURE — 3074F SYST BP LT 130 MM HG: CPT | Performed by: INTERNAL MEDICINE

## 2024-08-09 PROCEDURE — 83835 ASSAY OF METANEPHRINES: CPT

## 2024-08-09 RX ORDER — HYDROCHLOROTHIAZIDE 12.5 MG/1
12.5 CAPSULE ORAL EVERY MORNING
Qty: 90 CAPSULE | Refills: 3 | Status: SHIPPED | OUTPATIENT
Start: 2024-08-09 | End: 2025-08-09

## 2024-08-09 ASSESSMENT — ENCOUNTER SYMPTOMS
HEADACHES: 1
DYSPNEA ON EXERTION: 1
DIAPHORESIS: 1

## 2024-08-13 LAB
ANNOTATION COMMENT IMP: ABNORMAL
METANEPHS SERPL-SCNC: <0.1 NMOL/L (ref 0–0.49)
NORMETANEPH FREE SERPL-SCNC: 1.01 NMOL/L (ref 0–0.89)

## 2024-08-14 LAB
DOPAMINE SERPL-MCNC: <30 PG/ML (ref 0–48)
EPINEPH PLAS-MCNC: <15 PG/ML (ref 0–62)
NOREPINEPH PLAS-MCNC: 683 PG/ML (ref 0–874)

## 2024-08-19 LAB — 5OH-INDOLEACETATE SERPL-MCNC: 11 NG/ML

## 2024-09-11 PROBLEM — J02.9 PHARYNGITIS: Status: RESOLVED | Noted: 2023-10-11 | Resolved: 2024-09-11

## 2024-09-12 ENCOUNTER — OFFICE VISIT (OUTPATIENT)
Dept: CARDIOLOGY | Facility: CLINIC | Age: 34
End: 2024-09-12
Payer: COMMERCIAL

## 2024-09-12 VITALS
HEART RATE: 91 BPM | SYSTOLIC BLOOD PRESSURE: 140 MMHG | BODY MASS INDEX: 50.63 KG/M2 | WEIGHT: 293 LBS | DIASTOLIC BLOOD PRESSURE: 80 MMHG

## 2024-09-12 DIAGNOSIS — Z86.711 HISTORY OF PULMONARY EMBOLISM: ICD-10-CM

## 2024-09-12 DIAGNOSIS — I10 PAROXYSMAL HYPERTENSION: Primary | ICD-10-CM

## 2024-09-12 PROCEDURE — 3077F SYST BP >= 140 MM HG: CPT | Performed by: INTERNAL MEDICINE

## 2024-09-12 PROCEDURE — 99213 OFFICE O/P EST LOW 20 MIN: CPT | Performed by: INTERNAL MEDICINE

## 2024-09-12 PROCEDURE — 93005 ELECTROCARDIOGRAM TRACING: CPT | Performed by: INTERNAL MEDICINE

## 2024-09-12 PROCEDURE — 93010 ELECTROCARDIOGRAM REPORT: CPT | Performed by: INTERNAL MEDICINE

## 2024-09-12 PROCEDURE — 3079F DIAST BP 80-89 MM HG: CPT | Performed by: INTERNAL MEDICINE

## 2024-09-12 RX ORDER — LABETALOL 100 MG/1
100 TABLET, FILM COATED ORAL 2 TIMES DAILY
Qty: 180 TABLET | Refills: 3 | Status: SHIPPED | OUTPATIENT
Start: 2024-09-12 | End: 2025-09-12

## 2024-09-12 NOTE — PROGRESS NOTES
Subjective   Ana Maria Layton is a 34 y.o. female.    Chief Complaint:  Paroxysmal hypertension.  Midsternal chest pressure.    HPI    She is here for follow-up of her initial evaluation for hypertension.  She also complains of intermittent flushing.  Occasional episodes of midsternal pressure and heaviness.  Notes blood pressures as high as 160 systolic.  We did a workup for secondary hypertension.  She is here for follow-up of the results.    Her cardiac history is significant for hypertension.  Other coronary disease risk factors include hyperlipidemia.     Past medical history: Had a cholecystectomy several months ago.  History of Crohn's disease.  History of anxiety.  History of obesity.     Allergies to medications: Amoxicillin, penicillins, Lorabid.     Social history: History of smoking.  Quit several months ago.  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  Has 2 children ages 16 and 6.     Family history: Father's had some heart problems unclear etiology.  No other family history.     Review of Systems   Constitutional: Positive for diaphoresis and malaise/fatigue.   Cardiovascular:  Positive for chest pain and dyspnea on exertion.   Neurological:  Positive for headaches.       Current Outpatient Medications   Medication Sig Dispense Refill    ALPRAZolam (Xanax) 1 mg tablet Take 1 tablet (1 mg) by mouth 2 times a day as needed for anxiety. 45 tablet 1    hydroCHLOROthiazide (Microzide) 12.5 mg capsule Take 1 capsule (12.5 mg) by mouth once daily in the morning. 90 capsule 3    lisinopril 30 mg tablet Take 1 tablet (30 mg) by mouth once daily. 100 tablet 3    labetalol (Normodyne) 100 mg tablet Take 1 tablet (100 mg) by mouth 2 times a day. 180 tablet 3     No current facility-administered medications for this visit.        Visit Vitals  /80 (BP Location: Left arm, Patient Position: Sitting) Comment: Physician notified   Pulse 91   Wt (!) 151 kg (333 lb)   BMI 50.63 kg/m²   Smoking Status Former   BSA 2.69 m²         Objective     Constitutional:       Appearance: Not in distress.   Neck:      Vascular: JVD normal.   Pulmonary:      Breath sounds: Normal breath sounds.   Cardiovascular:      Normal rate. Regular rhythm. Normal S1. Normal S2.       Murmurs: There is no murmur.      No gallop.    Pulses:     Intact distal pulses.   Edema:     Peripheral edema absent.   Abdominal:      General: There is no distension.      Palpations: Abdomen is soft.   Neurological:      Mental Status: Alert.         Lab Review:   Lab Results   Component Value Date     04/20/2024    K 4.9 04/20/2024     04/20/2024    CO2 28 04/20/2024    BUN 18 04/20/2024    CREATININE 0.90 04/20/2024    GLUCOSE 103 (H) 04/20/2024    CALCIUM 9.0 04/20/2024       Assessment:    1.  Hypertension.  Also somewhat tachycardiac.  Will start labetalol 100 mg twice daily.  This drug would also be safe in pregnancy.  We screened her for pheochromocytoma.  This is negative.  We started hydrochlorothiazide 12.5 mg daily.  This showed a modest improvement in her blood pressures.    2.  Intermittent flushing.  We screened her for carcinoid.  This was negative.    3.  Hyperlipidemia.  Total cholesterol 167.

## 2024-09-13 ENCOUNTER — APPOINTMENT (OUTPATIENT)
Dept: CARDIOLOGY | Facility: CLINIC | Age: 34
End: 2024-09-13
Payer: COMMERCIAL

## 2024-10-31 PROBLEM — E66.01 OBESITY, MORBID, BMI 40.0-49.9 (MULTI): Status: RESOLVED | Noted: 2023-05-15 | Resolved: 2024-10-31

## 2024-11-04 ENCOUNTER — APPOINTMENT (OUTPATIENT)
Dept: PRIMARY CARE | Facility: CLINIC | Age: 34
End: 2024-11-04
Payer: COMMERCIAL

## 2024-11-04 VITALS
HEART RATE: 99 BPM | DIASTOLIC BLOOD PRESSURE: 74 MMHG | TEMPERATURE: 98 F | BODY MASS INDEX: 50.78 KG/M2 | SYSTOLIC BLOOD PRESSURE: 128 MMHG | WEIGHT: 293 LBS | OXYGEN SATURATION: 99 %

## 2024-11-04 DIAGNOSIS — I10 BENIGN ESSENTIAL HTN: ICD-10-CM

## 2024-11-04 DIAGNOSIS — F41.9 ANXIETY DISORDER, UNSPECIFIED TYPE: Primary | ICD-10-CM

## 2024-11-04 DIAGNOSIS — Z51.81 ENCOUNTER FOR MEDICATION MONITORING: ICD-10-CM

## 2024-11-04 DIAGNOSIS — F41.9 ANXIETY: ICD-10-CM

## 2024-11-04 PROCEDURE — 3078F DIAST BP <80 MM HG: CPT | Performed by: INTERNAL MEDICINE

## 2024-11-04 PROCEDURE — 99214 OFFICE O/P EST MOD 30 MIN: CPT | Performed by: INTERNAL MEDICINE

## 2024-11-04 PROCEDURE — 3074F SYST BP LT 130 MM HG: CPT | Performed by: INTERNAL MEDICINE

## 2024-11-04 RX ORDER — ALPRAZOLAM 1 MG/1
1 TABLET ORAL 2 TIMES DAILY PRN
Qty: 45 TABLET | Refills: 1 | Status: SHIPPED | OUTPATIENT
Start: 2024-11-04 | End: 2025-07-02

## 2024-11-04 ASSESSMENT — PATIENT HEALTH QUESTIONNAIRE - PHQ9
SUM OF ALL RESPONSES TO PHQ9 QUESTIONS 1 AND 2: 0
1. LITTLE INTEREST OR PLEASURE IN DOING THINGS: NOT AT ALL
2. FEELING DOWN, DEPRESSED OR HOPELESS: NOT AT ALL

## 2024-11-04 NOTE — PATIENT INSTRUCTIONS
Plan  Urine tests ordered  Discussed about obesity and complications related to it. Discussed about weight reduction and regular exercise to decrease weight. Questions related to it answered to patient's satisfaction.  Requested prescription/s refill done to the pharmacy of patient choice .  Bp is under good control today   F/U 3 months or as needed

## 2024-11-04 NOTE — PROGRESS NOTES
My nurse note reviewed. Patient is here for:  Follow-up     Here with her daughter, 7 yrs old.   She may quit her job in Jan.   Sees Dr Kenney for Htn and flushing.   Hx of anxiety , morbid obesity , HTN   Patient denies any shortness of breath, PND, orthopnea, chest pain , palpitation, syncope or edema in legs  Does do dog walking     OBJECTIVE :  Blood pressure 128/74, pulse 99, temperature 36.7 °C (98 °F), weight (!) 152 kg (334 lb), SpO2 99%.  CVS: S1 S2 + , no S3. No loud heart murmur appreciated. Lungs clear, No edema  CNS: Patient is alert, oriented moving all 4 extremities well. No motor weakness noted on gross neurological exam. No involuntary movements or tremors noted.    Needs refill on xanax    Assessment:  1. Anxiety disorder, unspecified type  Opiate/Opioid/Benzo Prescription Compliance      2. Anxiety  ALPRAZolam (Xanax) 1 mg tablet      3. Encounter for medication monitoring  Opiate/Opioid/Benzo Prescription Compliance      4. Benign essential HTN  controlled          Plan  Urine tests ordered  Discussed about obesity and complications related to it. Discussed about weight reduction and regular exercise to decrease weight. Questions related to it answered to patient's satisfaction.  Requested prescription/s refill done to the pharmacy of patient choice .  Bp is under good control today   F/U 3 months or as needed

## 2024-11-12 ENCOUNTER — LAB (OUTPATIENT)
Dept: LAB | Facility: LAB | Age: 34
End: 2024-11-12
Payer: COMMERCIAL

## 2024-11-12 DIAGNOSIS — Z51.81 ENCOUNTER FOR MEDICATION MONITORING: ICD-10-CM

## 2024-11-12 DIAGNOSIS — F41.9 ANXIETY DISORDER, UNSPECIFIED TYPE: ICD-10-CM

## 2024-11-12 PROCEDURE — 80373 DRUG SCREENING TRAMADOL: CPT

## 2024-11-12 PROCEDURE — 80346 BENZODIAZEPINES1-12: CPT

## 2024-11-12 PROCEDURE — 80361 OPIATES 1 OR MORE: CPT

## 2024-11-12 PROCEDURE — 80368 SEDATIVE HYPNOTICS: CPT

## 2024-11-12 PROCEDURE — 80354 DRUG SCREENING FENTANYL: CPT

## 2024-11-12 PROCEDURE — 82570 ASSAY OF URINE CREATININE: CPT

## 2024-11-12 PROCEDURE — 80307 DRUG TEST PRSMV CHEM ANLYZR: CPT

## 2024-11-12 PROCEDURE — 80358 DRUG SCREENING METHADONE: CPT

## 2024-11-12 PROCEDURE — 80365 DRUG SCREENING OXYCODONE: CPT

## 2024-11-13 LAB
AMPHETAMINES UR QL SCN: NORMAL
BARBITURATES UR QL SCN: NORMAL
BZE UR QL SCN: NORMAL
CANNABINOIDS UR QL SCN: NORMAL
CREAT UR-MCNC: 188.7 MG/DL (ref 20–320)
PCP UR QL SCN: NORMAL

## 2024-11-15 LAB
1OH-MIDAZOLAM UR CFM-MCNC: <25 NG/ML
6MAM UR CFM-MCNC: <25 NG/ML
7AMINOCLONAZEPAM UR CFM-MCNC: <25 NG/ML
A-OH ALPRAZ UR CFM-MCNC: 411 NG/ML
ALPRAZ UR CFM-MCNC: 125 NG/ML
CHLORDIAZEP UR CFM-MCNC: <25 NG/ML
CLONAZEPAM UR CFM-MCNC: <25 NG/ML
CODEINE UR CFM-MCNC: <50 NG/ML
DIAZEPAM UR CFM-MCNC: <25 NG/ML
EDDP UR CFM-MCNC: <25 NG/ML
FENTANYL UR CFM-MCNC: <2.5 NG/ML
HYDROCODONE CTO UR CFM-MCNC: <25 NG/ML
HYDROMORPHONE UR CFM-MCNC: <25 NG/ML
LORAZEPAM UR CFM-MCNC: <25 NG/ML
METHADONE UR CFM-MCNC: <25 NG/ML
MIDAZOLAM UR CFM-MCNC: <25 NG/ML
MORPHINE UR CFM-MCNC: <50 NG/ML
NORDIAZEPAM UR CFM-MCNC: <25 NG/ML
NORFENTANYL UR CFM-MCNC: <2.5 NG/ML
NORHYDROCODONE UR CFM-MCNC: <25 NG/ML
NOROXYCODONE UR CFM-MCNC: <25 NG/ML
NORTRAMADOL UR-MCNC: <50 NG/ML
OXAZEPAM UR CFM-MCNC: <25 NG/ML
OXYCODONE UR CFM-MCNC: <25 NG/ML
OXYMORPHONE UR CFM-MCNC: <25 NG/ML
TEMAZEPAM UR CFM-MCNC: <25 NG/ML
TRAMADOL UR CFM-MCNC: <50 NG/ML
ZOLPIDEM UR CFM-MCNC: <25 NG/ML
ZOLPIDEM UR-MCNC: <25 NG/ML

## 2024-11-21 ENCOUNTER — OFFICE VISIT (OUTPATIENT)
Dept: CARDIOLOGY | Facility: CLINIC | Age: 34
End: 2024-11-21
Payer: COMMERCIAL

## 2024-11-21 VITALS
DIASTOLIC BLOOD PRESSURE: 74 MMHG | HEART RATE: 58 BPM | SYSTOLIC BLOOD PRESSURE: 120 MMHG | WEIGHT: 293 LBS | BODY MASS INDEX: 45.99 KG/M2 | HEIGHT: 67 IN

## 2024-11-21 DIAGNOSIS — Z86.711 HISTORY OF PULMONARY EMBOLISM: ICD-10-CM

## 2024-11-21 DIAGNOSIS — I10 PAROXYSMAL HYPERTENSION: Primary | ICD-10-CM

## 2024-11-21 DIAGNOSIS — F17.200 NICOTINE DEPENDENCE, UNCOMPLICATED, UNSPECIFIED NICOTINE PRODUCT TYPE: ICD-10-CM

## 2024-11-21 PROCEDURE — 3074F SYST BP LT 130 MM HG: CPT | Performed by: INTERNAL MEDICINE

## 2024-11-21 PROCEDURE — 3008F BODY MASS INDEX DOCD: CPT | Performed by: INTERNAL MEDICINE

## 2024-11-21 PROCEDURE — 3078F DIAST BP <80 MM HG: CPT | Performed by: INTERNAL MEDICINE

## 2024-11-21 PROCEDURE — 99213 OFFICE O/P EST LOW 20 MIN: CPT | Performed by: INTERNAL MEDICINE

## 2024-11-21 NOTE — PROGRESS NOTES
"Subjective   Ana Maria Layton is a 34 y.o. female.    Chief Complaint:  Follow-up paroxysmal hypertension.    HPI    On her last visit we started labetalol therapy.  She is tolerating it very well.  Blood pressures are significantly improved.  No longer having the symptoms of flushing.  No longer having spikes in blood pressure.  Midsternal chest discomfort has resolved.    Her cardiac history is significant for hypertension.  Other coronary disease risk factors include hyperlipidemia.     Past medical history: Had a cholecystectomy several months ago.  History of Crohn's disease.  History of anxiety.  History of obesity.     Allergies to medications: Amoxicillin, penicillins, Lorabid.     Social history: History of smoking.  Quit several months ago.  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     Family history: Father's had some heart problems unclear etiology.  No other family history.     Review of Systems   Constitutional: Positive for diaphoresis and malaise/fatigue.   Cardiovascular:  Positive for chest pain and dyspnea on exertion.   Neurological:  Positive for headaches.     Current Outpatient Medications   Medication Sig Dispense Refill    ALPRAZolam (Xanax) 1 mg tablet Take 1 tablet (1 mg) by mouth 2 times a day as needed for anxiety. 45 tablet 1    hydroCHLOROthiazide (Microzide) 12.5 mg capsule Take 1 capsule (12.5 mg) by mouth once daily in the morning. 90 capsule 3    labetalol (Normodyne) 100 mg tablet Take 1 tablet (100 mg) by mouth 2 times a day. 180 tablet 3    lisinopril 30 mg tablet Take 1 tablet (30 mg) by mouth once daily. 100 tablet 3     No current facility-administered medications for this visit.        Visit Vitals  /74 (BP Location: Left arm, Patient Position: Sitting, BP Cuff Size: Adult)   Pulse 58   Ht 1.702 m (5' 7\")   Wt 150 kg (330 lb 3.2 oz)   BMI 51.72 kg/m²   Smoking Status Former   BSA 2.66 m²        Objective     Constitutional:       Appearance: Not " in distress.   Neck:      Vascular: JVD normal.   Pulmonary:      Breath sounds: Normal breath sounds.   Cardiovascular:      Normal rate. Regular rhythm. Normal S1. Normal S2.       Murmurs: There is no murmur.      No gallop.    Pulses:     Intact distal pulses.   Edema:     Peripheral edema absent.   Abdominal:      General: There is no distension.      Palpations: Abdomen is soft.   Neurological:      Mental Status: Alert.         Lab Review:   Lab Results   Component Value Date     04/20/2024    K 4.9 04/20/2024     04/20/2024    CO2 28 04/20/2024    BUN 18 04/20/2024    CREATININE 0.90 04/20/2024    GLUCOSE 103 (H) 04/20/2024    CALCIUM 9.0 04/20/2024       Assessment:    1.  Hypertension.  Well-controlled on today's visit.  Continue labetalol lisinopril and hydrochlorothiazide.    2.  Hyperlipidemia.  Cholesterol is reasonable with a total cholesterol 167.    3.  Obesity.  Discussed some options.    We will see her on an as-needed basis.

## 2024-11-22 ENCOUNTER — TELEPHONE (OUTPATIENT)
Dept: PRIMARY CARE | Facility: CLINIC | Age: 34
End: 2024-11-22
Payer: COMMERCIAL

## 2024-11-22 NOTE — TELEPHONE ENCOUNTER
Notified      ----- Message from Jose Alberto Anderson sent at 11/18/2024 11:19 AM EST -----  I have reviewed your recent urine drug testing results ordered by me and it was positive for Alpraxolam (xanax)  medication as expected. Please notify patient. Thanks.

## 2024-12-12 DIAGNOSIS — F41.9 ANXIETY: ICD-10-CM

## 2024-12-12 RX ORDER — ALPRAZOLAM 1 MG/1
1 TABLET ORAL 2 TIMES DAILY PRN
Qty: 45 TABLET | Refills: 1 | Status: SHIPPED | OUTPATIENT
Start: 2024-12-12 | End: 2025-08-09

## 2025-02-24 ENCOUNTER — APPOINTMENT (OUTPATIENT)
Dept: PRIMARY CARE | Facility: CLINIC | Age: 35
End: 2025-02-24
Payer: COMMERCIAL

## 2025-02-24 ENCOUNTER — APPOINTMENT (OUTPATIENT)
Dept: PRIMARY CARE | Facility: CLINIC | Age: 35
End: 2025-02-24
Payer: MEDICAID

## 2025-02-24 VITALS
HEART RATE: 85 BPM | SYSTOLIC BLOOD PRESSURE: 105 MMHG | DIASTOLIC BLOOD PRESSURE: 70 MMHG | OXYGEN SATURATION: 98 % | BODY MASS INDEX: 45.99 KG/M2 | WEIGHT: 293 LBS | TEMPERATURE: 97.2 F | HEIGHT: 67 IN

## 2025-02-24 DIAGNOSIS — K50.90 CROHN'S DISEASE WITHOUT COMPLICATION, UNSPECIFIED GASTROINTESTINAL TRACT LOCATION: ICD-10-CM

## 2025-02-24 DIAGNOSIS — F41.9 ANXIETY: Primary | ICD-10-CM

## 2025-02-24 DIAGNOSIS — E66.01 MORBID OBESITY WITH BODY MASS INDEX (BMI) GREATER THAN OR EQUAL TO 50 (MULTI): ICD-10-CM

## 2025-02-24 DIAGNOSIS — I10 BENIGN ESSENTIAL HTN: ICD-10-CM

## 2025-02-24 DIAGNOSIS — F17.200 SMOKER: ICD-10-CM

## 2025-02-24 PROCEDURE — 1036F TOBACCO NON-USER: CPT | Performed by: INTERNAL MEDICINE

## 2025-02-24 PROCEDURE — 99406 BEHAV CHNG SMOKING 3-10 MIN: CPT | Performed by: INTERNAL MEDICINE

## 2025-02-24 PROCEDURE — 99214 OFFICE O/P EST MOD 30 MIN: CPT | Performed by: INTERNAL MEDICINE

## 2025-02-24 PROCEDURE — 3078F DIAST BP <80 MM HG: CPT | Performed by: INTERNAL MEDICINE

## 2025-02-24 PROCEDURE — 3008F BODY MASS INDEX DOCD: CPT | Performed by: INTERNAL MEDICINE

## 2025-02-24 PROCEDURE — 3074F SYST BP LT 130 MM HG: CPT | Performed by: INTERNAL MEDICINE

## 2025-02-24 RX ORDER — ALPRAZOLAM 1 MG/1
1 TABLET ORAL 2 TIMES DAILY PRN
Qty: 45 TABLET | Refills: 1 | Status: SHIPPED | OUTPATIENT
Start: 2025-02-24 | End: 2025-10-22

## 2025-02-24 ASSESSMENT — ENCOUNTER SYMPTOMS
LOSS OF SENSATION IN FEET: 0
OCCASIONAL FEELINGS OF UNSTEADINESS: 0
DEPRESSION: 0

## 2025-02-24 ASSESSMENT — COLUMBIA-SUICIDE SEVERITY RATING SCALE - C-SSRS
1. IN THE PAST MONTH, HAVE YOU WISHED YOU WERE DEAD OR WISHED YOU COULD GO TO SLEEP AND NOT WAKE UP?: NO
2. HAVE YOU ACTUALLY HAD ANY THOUGHTS OF KILLING YOURSELF?: NO
6. HAVE YOU EVER DONE ANYTHING, STARTED TO DO ANYTHING, OR PREPARED TO DO ANYTHING TO END YOUR LIFE?: NO

## 2025-02-24 ASSESSMENT — PATIENT HEALTH QUESTIONNAIRE - PHQ9
1. LITTLE INTEREST OR PLEASURE IN DOING THINGS: NOT AT ALL
2. FEELING DOWN, DEPRESSED OR HOPELESS: NOT AT ALL
SUM OF ALL RESPONSES TO PHQ9 QUESTIONS 1 AND 2: 0

## 2025-02-24 NOTE — PATIENT INSTRUCTIONS
Plan;  Current medications are effective. advised to continue current medications.  Discussed about obesity and complications related to it. Discussed about weight reduction and regular exercise to decrease weight. Questions related to it answered to patient's satisfaction.  Discussed in detail about smoking issue. Patient understands health risk including effect on heart, lung, stroke with smoking. Discussed about smoking cessation. Will let me know if interested in quitting smoking. Total time was > 3 minutes.  F/U 3 months or as needed

## 2025-02-24 NOTE — PROGRESS NOTES
My nurse note reviewed. Patient is here for:  Follow-up (Pt is here for F/U refill on xanax )   Here with 7 yrs old daughter, Jihan  Doing ok .   Anxiety is under control with xanax . Needs refill.   Taking meds ok   CVS: S1 S2 + , no S3. No loud heart murmur appreciated. Lungs clear, No edema  No blood in stool lately   Still vapes, trying to cut down .   Not doing much exercise.     I have reviewed all active medications patient is currently on . Questions related to medication answered to patient's satisfaction.    Assessment:  1. Anxiety -on xanax. Refill done    2. Benign essential HTN -controlled   With med.    3. Morbid obesity with body mass index (BMI) greater than or equal to 50 (Multi)    4. Smoker -discussed quitting    5 crohn's dis- hx of. Symptoms controlled lately   Plan;  Current medications are effective. advised to continue current medications.  Discussed about obesity and complications related to it. Discussed about weight reduction and regular exercise to decrease weight. Questions related to it answered to patient's satisfaction.  I have personally reviewed the [facility]  report on this patient.  This report is scanned into the electronic medical record. I have considered the risks of abuse dependence addiction and diversion. I believe it is clinically appropriate for this patient to be prescribed the medications.    Discussed in detail about smoking issue. Patient understands health risk including effect on heart, lung, stroke with smoking. Discussed about smoking cessation. Will let me know if interested in quitting smoking. Total time was > 3 minutes.  F/U 3 months or as needed

## 2025-03-28 ENCOUNTER — TELEPHONE (OUTPATIENT)
Dept: PRIMARY CARE | Facility: CLINIC | Age: 35
End: 2025-03-28
Payer: COMMERCIAL

## 2025-04-18 ENCOUNTER — DOCUMENTATION (OUTPATIENT)
Dept: GASTROENTEROLOGY | Facility: CLINIC | Age: 35
End: 2025-04-18
Payer: COMMERCIAL

## 2025-04-19 NOTE — PROGRESS NOTES
Patient called into the GI emergency line with GI symptoms and wanting steroids - has not been seen in 3 years - advised patient that she needs to go to the ED.

## 2025-05-19 ENCOUNTER — APPOINTMENT (OUTPATIENT)
Dept: PRIMARY CARE | Facility: CLINIC | Age: 35
End: 2025-05-19
Payer: MEDICAID

## 2025-05-19 VITALS
TEMPERATURE: 95.4 F | BODY MASS INDEX: 47.77 KG/M2 | DIASTOLIC BLOOD PRESSURE: 74 MMHG | SYSTOLIC BLOOD PRESSURE: 116 MMHG | OXYGEN SATURATION: 98 % | HEART RATE: 84 BPM | WEIGHT: 293 LBS

## 2025-05-19 DIAGNOSIS — R63.4 WEIGHT LOSS: ICD-10-CM

## 2025-05-19 DIAGNOSIS — Z83.3 FAMILY HISTORY OF DIABETES MELLITUS: ICD-10-CM

## 2025-05-19 DIAGNOSIS — R21 RASH: ICD-10-CM

## 2025-05-19 DIAGNOSIS — I10 BENIGN ESSENTIAL HTN: ICD-10-CM

## 2025-05-19 DIAGNOSIS — E66.813 CLASS 3 SEVERE OBESITY WITHOUT SERIOUS COMORBIDITY WITH BODY MASS INDEX (BMI) OF 45.0 TO 49.9 IN ADULT, UNSPECIFIED OBESITY TYPE: ICD-10-CM

## 2025-05-19 DIAGNOSIS — F41.9 ANXIETY: Primary | ICD-10-CM

## 2025-05-19 PROCEDURE — 3078F DIAST BP <80 MM HG: CPT | Performed by: INTERNAL MEDICINE

## 2025-05-19 PROCEDURE — 99213 OFFICE O/P EST LOW 20 MIN: CPT | Performed by: INTERNAL MEDICINE

## 2025-05-19 PROCEDURE — 3074F SYST BP LT 130 MM HG: CPT | Performed by: INTERNAL MEDICINE

## 2025-05-19 RX ORDER — NYSTATIN 100000 U/G
CREAM TOPICAL 2 TIMES DAILY
Qty: 30 G | Refills: 1 | Status: SHIPPED | OUTPATIENT
Start: 2025-05-19 | End: 2025-06-03

## 2025-05-19 ASSESSMENT — PATIENT HEALTH QUESTIONNAIRE - PHQ9
2. FEELING DOWN, DEPRESSED OR HOPELESS: NOT AT ALL
1. LITTLE INTEREST OR PLEASURE IN DOING THINGS: NOT AT ALL
SUM OF ALL RESPONSES TO PHQ9 QUESTIONS 1 AND 2: 0

## 2025-05-19 ASSESSMENT — PAIN SCALES - GENERAL: PAINLEVEL_OUTOF10: 0-NO PAIN

## 2025-05-19 ASSESSMENT — ENCOUNTER SYMPTOMS: DEPRESSION: 0

## 2025-05-19 NOTE — PROGRESS NOTES
My nurse note reviewed. Patient is here for:  Follow-up and Weight Loss (Losing weight unexpectedly )   Here with 7 yrs old daughter , Jihan  Taking meds ok   She has been losing some weight for few months without trying to lose weight so worried about it. Wants some blood work done. As per her she has cut down on pops drinking  No hx of thyroid and DM but has FH of DM   No change in meds recently   Patient denies any shortness of breath, PND, orthopnea, chest pain , palpitation, syncope or edema in legs  OBJECTIVE :  /74 (BP Location: Left arm, Patient Position: Sitting)   Pulse 84   Temp 35.2 °C (95.4 °F) (Temporal)   Wt 138 kg (305 lb)   LMP 05/07/2025   SpO2 98%   BMI 47.77 kg/m²   Morbidly obese  CVS: S1 S2 + , no S3. No loud heart murmur appreciated. Lungs clear, No edema  CNS: Patient is alert, oriented moving all 4 extremities well. No motor weakness noted on gross neurological exam. No involuntary movements or tremors noted.    Assessment:  1. Anxiety  On xanax . controlled  Continue it      2. Benign essential HTN  On meds, controlled        3. Family history of diabetes mellitus        4. Weight loss  Blood work ordered.       5 rash-as per pt she has rash in groin both sides . She had it in past and dermatologist gave some meds but doesnot know. Will try Nystatin, if not better then derm ref. Pt agreed  Plan  Current medications are effective. advised to continue current medications.  Blood work ordered   Will monitor weights  Nystatin cream prescribed  Anxiety is under control  F/U 3 months .

## 2025-05-19 NOTE — PATIENT INSTRUCTIONS
Plan  Current medications are effective. advised to continue current medications.  Blood work ordered   Will monitor weights  Nystatin cream prescribed  Anxiety is under control  F/U 3 months .

## 2025-05-20 DIAGNOSIS — I10 BENIGN ESSENTIAL HTN: ICD-10-CM

## 2025-05-20 PROBLEM — E34.00 CARCINOID SYNDROME: Status: RESOLVED | Noted: 2024-08-09 | Resolved: 2025-05-20

## 2025-05-21 RX ORDER — LISINOPRIL 30 MG/1
30 TABLET ORAL DAILY
Qty: 90 TABLET | Refills: 4 | Status: SHIPPED | OUTPATIENT
Start: 2025-05-21

## 2025-07-13 LAB
ALBUMIN SERPL-MCNC: 4.3 G/DL (ref 3.6–5.1)
ALBUMIN/GLOB SERPL: 1.7 (CALC) (ref 1–2.5)
ALP SERPL-CCNC: 57 U/L (ref 31–125)
ALT SERPL-CCNC: 23 U/L (ref 6–29)
ANION GAP SERPL CALCULATED.4IONS-SCNC: 9 MMOL/L (CALC) (ref 7–17)
AST SERPL-CCNC: 14 U/L (ref 10–30)
BASOPHILS # BLD AUTO: 50 CELLS/UL (ref 0–200)
BASOPHILS NFR BLD AUTO: 0.9 %
BILIRUB DIRECT SERPL-MCNC: 0.1 MG/DL
BILIRUB INDIRECT SERPL-MCNC: 0.2 MG/DL (CALC) (ref 0.2–1.2)
BILIRUB SERPL-MCNC: 0.3 MG/DL (ref 0.2–1.2)
BUN SERPL-MCNC: 21 MG/DL (ref 7–25)
BUN/CREAT SERPL: 21 (CALC) (ref 6–22)
CALCIUM SERPL-MCNC: 9.4 MG/DL (ref 8.6–10.2)
CHLORIDE SERPL-SCNC: 104 MMOL/L (ref 98–110)
CHOLEST SERPL-MCNC: 183 MG/DL
CHOLEST/HDLC SERPL: 4.2 (CALC)
CO2 SERPL-SCNC: 27 MMOL/L (ref 20–32)
CREAT SERPL-MCNC: 0.99 MG/DL (ref 0.5–0.97)
EGFRCR SERPLBLD CKD-EPI 2021: 76 ML/MIN/1.73M2
EOSINOPHIL # BLD AUTO: 62 CELLS/UL (ref 15–500)
EOSINOPHIL NFR BLD AUTO: 1.1 %
ERYTHROCYTE [DISTWIDTH] IN BLOOD BY AUTOMATED COUNT: 14.1 % (ref 11–15)
EST. AVERAGE GLUCOSE BLD GHB EST-MCNC: 103 MG/DL
EST. AVERAGE GLUCOSE BLD GHB EST-SCNC: 5.7 MMOL/L
GLOBULIN SER CALC-MCNC: 2.6 G/DL (CALC) (ref 1.9–3.7)
GLUCOSE SERPL-MCNC: 93 MG/DL (ref 65–99)
HBA1C MFR BLD: 5.2 %
HCT VFR BLD AUTO: 45.6 % (ref 35–45)
HDLC SERPL-MCNC: 44 MG/DL
HGB BLD-MCNC: 14 G/DL (ref 11.7–15.5)
LDLC SERPL CALC-MCNC: 121 MG/DL (CALC)
LYMPHOCYTES # BLD AUTO: 1389 CELLS/UL (ref 850–3900)
LYMPHOCYTES NFR BLD AUTO: 24.8 %
MCH RBC QN AUTO: 25.4 PG (ref 27–33)
MCHC RBC AUTO-ENTMCNC: 30.7 G/DL (ref 32–36)
MCV RBC AUTO: 82.6 FL (ref 80–100)
MONOCYTES # BLD AUTO: 308 CELLS/UL (ref 200–950)
MONOCYTES NFR BLD AUTO: 5.5 %
NEUTROPHILS # BLD AUTO: 3791 CELLS/UL (ref 1500–7800)
NEUTROPHILS NFR BLD AUTO: 67.7 %
NONHDLC SERPL-MCNC: 139 MG/DL (CALC)
PLATELET # BLD AUTO: 285 THOUSAND/UL (ref 140–400)
PMV BLD REES-ECKER: 11.1 FL (ref 7.5–12.5)
POTASSIUM SERPL-SCNC: 4.6 MMOL/L (ref 3.5–5.3)
PROT SERPL-MCNC: 6.9 G/DL (ref 6.1–8.1)
RBC # BLD AUTO: 5.52 MILLION/UL (ref 3.8–5.1)
SODIUM SERPL-SCNC: 140 MMOL/L (ref 135–146)
TRIGL SERPL-MCNC: 81 MG/DL
TSH SERPL-ACNC: 1.56 MIU/L
WBC # BLD AUTO: 5.6 THOUSAND/UL (ref 3.8–10.8)

## 2025-07-16 NOTE — OP NOTE
SURGEON:  Kleber Ferris MD    ANESTHESIA:    General endotracheal.    ASSISTANT:    Wilmar Watters SA.    PREOPERATIVE DIAGNOSIS:    Chronic calculous cholecystitis.    PROCEDURE:    Laparoscopic cholecystectomy with operative cholangiogram.    POSTOPERATIVE DIAGNOSIS:    Chronic calculous cholecystitis.    INDICATIONS:    This 33-year-old female with a BMI of 52.5, presented with recurrent  episodes of right upper quadrant pain, radiating to the back.  She  had signs and symptoms, physical findings, diagnostic imaging  consistent with chronic calculous cholecystitis noted with a stone in  the neck of the gallbladder.  The risks, the benefits, the  alternatives; as well as the risks, benefits, alternatives of the  procedure were discussed prior to surgery the patient agreed to  proceed.     DESCRIPTION OF PROCEDURE:    She was placed on the operating room table.  After adequate levels of  induction of general endotracheal anesthesia, the abdomen was prepped  and draped in a sterile fashion.  Two-phase audible time-out  including participation by the patient at the preinduction phase was  performed in standard fashion.  All questions were answered.  All  agreed to proceed.     A transverse incision below the umbilicus was carried down from the  dermis to subcutaneous tissue using the knife via blunt dissection.  The peritoneum was isolated, opened behind sharp scissor dissection  to atraumatically enter the abdominal cavity.  A 12 mm balloon port  was introduced.  Pneumoperitoneum established to 15 cm.  Immediate  presentation revealed a mildly distended gallbladder in the right  upper quadrant.  5 mm ports were placed on the midclavicular,  anterior axillary, and subxiphoid region after localization with 0.5%  Marcaine in standard fashion.  With the patient in reverse  Trendelenburg position, rotated to the patient's left.  The  gallbladder was retracted over the liver.  The overlying  Patient signed out to me by Dr. Gomez at 1545, having presented with palpitations and then cleared for safe discharge home by the previous ED physician as well as cardiology (Dr. Skaggs) pending a reassuring repeat troponin, given their low suspicion for acute coronary syndrome.  Per the previous physician there was no indication from his standpoint to investigate for pulmonary embolism.  Her repeat troponin is unremarkable, on exam symptoms have resolved and she's clinically well appearing with stable vital signs.  Will discharge home and have her follow up with the outpatient cardiomyopathy clinic at [facility] per Dr. Skaggs.    The patient has been given verbal and written advice regarding today's presentation including reasons to re-attend, specifically signs and symptoms of concern or worsening condition were discussed and understood by the patient.          Thi Sanderson MD  07/16/25 4060     peritoneum  at the neck of the gallbladder was scored behind cautery dissection.  Behind blunt dissection, I dissected out the neck of the gallbladder.   I dissected out the junction of the gallbladder, a nondilated cystic  duct.  This was skeletonized to view the posterior component of the  cystic duct.  Cystic artery was identified, skeletonized, doubly  clipped proximally, clipped distally to complete the critical view.  A 12-gauge Angiocath was then introduced in the abdominal cavity,  overlying the junction of cystic duct and the catheter.  The Ranfac  catheter was fed.  The junction of the cystic duct and the  gallbladder was opened behind sharp scissor dissection.  The Ranfac  catheter was fed and clipped.  Operative cholangiograms with the clip  applier in place demonstrated free flow of dye into the duodenum.  The biliary tree was small in diameter without evidence of  intraluminal filling defects.  The patient again was placed in  Trendelenburg position to demonstrate flow into the intrahepatic  ducts from the anatomy.  The catheter was removed after  reestablishment of pneumoperitoneum and surgical positioning.  Proximal cystic duct was doubly clipped, had been clipped distally  and divided.  Cystic artery had been triply clipped proximally,  clipped distally and divided.  The gallbladder was then taken out of  the liver bed in standard antegrade fashion.  Attachments at the  level of neck of the gallbladder were controlled behind silver clips  and cautery dissection.  With the gallbladder minimally attached, I  examined the liver bed.  There was no evidence of active hemorrhage.  Clips were identified on the cystic stump, cystic artery.  The  gallbladder was subsequently amputated.  The gallbladder was placed  in a retrieval bag, brought out through the umbilicus without contact  with the abdominal wall.  I did have to extend the incision at the  umbilicus to allow for passage of the large stone.   Pneumoperitoneum  was reestablished.  I irrigated the right upper quadrant.  There was  evidence of active hemorrhage.  Clips were identified on the cystic  stump, cystic artery.  There was no evidence of active hemorrhage or  bile leak.  Any residual fluid or debris was removed.  All ports were  then removed without evidence of bleeding from the abdominal wall.  Fascial defect at the umbilicus was closed with a figure-of-eight and  a simple suture of 0 Vicryl under direct vision.  Additional 0.5%  Marcaine was placed in the deep and superficial planes.  Skin was  closed with subcuticular closures of 4-0 Vicryl.  Steri-Strips, dry  sterile occlusive dressings were applied.  She tolerated the  procedure without complication, transferred to the recovery room  following extubation in stable condition.  Needle and sponge counts  were reported to be correct.       Kleber Ferris MD    DD:  09/29/2023 09:56:58 EST  DT:  09/29/2023 10:16:50 EST  DICTATION NUMBER:  [id number]  INTERNAL JOB NUMBER:  [id number]             Electronic Signatures:  Kleber Ferris) (Signed on 02-Oct-2023 09:25)   Authored  Unsigned, Draft (SYS GENERATED) (Entered on 29-Sep-2023 10:16)   Entered    Last Updated: 02-Oct-2023 09:25 by Kleber Ferris)

## 2025-07-27 DIAGNOSIS — I10 PAROXYSMAL HYPERTENSION: ICD-10-CM

## 2025-07-28 RX ORDER — HYDROCHLOROTHIAZIDE 12.5 MG/1
12.5 CAPSULE ORAL EVERY MORNING
Qty: 90 CAPSULE | Refills: 3 | Status: SHIPPED | OUTPATIENT
Start: 2025-07-28 | End: 2026-07-28

## 2025-08-18 ENCOUNTER — APPOINTMENT (OUTPATIENT)
Dept: PRIMARY CARE | Facility: CLINIC | Age: 35
End: 2025-08-18
Payer: COMMERCIAL

## 2025-08-18 VITALS
TEMPERATURE: 98 F | WEIGHT: 293 LBS | BODY MASS INDEX: 46.99 KG/M2 | DIASTOLIC BLOOD PRESSURE: 69 MMHG | HEART RATE: 76 BPM | OXYGEN SATURATION: 98 % | SYSTOLIC BLOOD PRESSURE: 110 MMHG

## 2025-08-18 DIAGNOSIS — Z02.9 ENCOUNTER FOR NARCOTIC CONTRACT DISCUSSION: ICD-10-CM

## 2025-08-18 DIAGNOSIS — E66.01 MORBID OBESITY (MULTI): ICD-10-CM

## 2025-08-18 DIAGNOSIS — Z51.81 ENCOUNTER FOR MEDICATION MONITORING: ICD-10-CM

## 2025-08-18 DIAGNOSIS — F41.9 ANXIETY DISORDER, UNSPECIFIED TYPE: ICD-10-CM

## 2025-08-18 DIAGNOSIS — F41.9 ANXIETY: Primary | ICD-10-CM

## 2025-08-18 PROCEDURE — 3074F SYST BP LT 130 MM HG: CPT | Performed by: INTERNAL MEDICINE

## 2025-08-18 PROCEDURE — 3078F DIAST BP <80 MM HG: CPT | Performed by: INTERNAL MEDICINE

## 2025-08-18 PROCEDURE — 99214 OFFICE O/P EST MOD 30 MIN: CPT | Performed by: INTERNAL MEDICINE

## 2025-08-19 ENCOUNTER — APPOINTMENT (OUTPATIENT)
Dept: PRIMARY CARE | Facility: CLINIC | Age: 35
End: 2025-08-19
Payer: COMMERCIAL

## 2025-08-28 DIAGNOSIS — I10 PAROXYSMAL HYPERTENSION: ICD-10-CM

## 2025-08-28 RX ORDER — LABETALOL 100 MG/1
100 TABLET, FILM COATED ORAL 2 TIMES DAILY
Qty: 180 TABLET | Refills: 3 | Status: SHIPPED | OUTPATIENT
Start: 2025-08-28

## 2025-11-17 ENCOUNTER — APPOINTMENT (OUTPATIENT)
Dept: PRIMARY CARE | Facility: CLINIC | Age: 35
End: 2025-11-17
Payer: COMMERCIAL